# Patient Record
Sex: MALE | Race: WHITE
[De-identification: names, ages, dates, MRNs, and addresses within clinical notes are randomized per-mention and may not be internally consistent; named-entity substitution may affect disease eponyms.]

---

## 2018-03-27 ENCOUNTER — HOSPITAL ENCOUNTER (INPATIENT)
Dept: HOSPITAL 80 - FED | Age: 48
LOS: 4 days | Discharge: HOME | DRG: 515 | End: 2018-03-31
Attending: SURGERY | Admitting: SURGERY
Payer: MEDICAID

## 2018-03-27 DIAGNOSIS — S32.029A: ICD-10-CM

## 2018-03-27 DIAGNOSIS — S22.059A: ICD-10-CM

## 2018-03-27 DIAGNOSIS — S42.022B: Primary | ICD-10-CM

## 2018-03-27 DIAGNOSIS — S63.502A: ICD-10-CM
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
## 2018-03-27 DIAGNOSIS — S27.2XXA: ICD-10-CM

## 2018-03-27 DIAGNOSIS — S42.102A: ICD-10-CM

## 2018-03-27 DIAGNOSIS — S27.329A: ICD-10-CM

## 2018-03-27 DIAGNOSIS — S06.0X9A: ICD-10-CM
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
## 2018-03-27 DIAGNOSIS — R40.2412: ICD-10-CM

## 2018-03-27 DIAGNOSIS — Z87.820: ICD-10-CM

## 2018-03-27 DIAGNOSIS — S22.42XA: ICD-10-CM

## 2018-03-27 LAB
INR PPP: 1.04 (ref 0.83–1.16)
PLATELET # BLD: 215 10^3/UL (ref 150–400)
PROTHROMBIN TIME: 13.8 SEC (ref 12–15)

## 2018-03-27 PROCEDURE — G0480 DRUG TEST DEF 1-7 CLASSES: HCPCS

## 2018-03-27 PROCEDURE — 0PSB04Z REPOSITION LEFT CLAVICLE WITH INTERNAL FIXATION DEVICE, OPEN APPROACH: ICD-10-PCS | Performed by: ORTHOPAEDIC SURGERY

## 2018-03-27 PROCEDURE — C1713 ANCHOR/SCREW BN/BN,TIS/BN: HCPCS

## 2018-03-27 RX ADMIN — HYDROCODONE BITARTRATE AND ACETAMINOPHEN PRN TAB: 5; 325 TABLET ORAL at 20:15

## 2018-03-27 RX ADMIN — Medication SCH MLS: at 20:38

## 2018-03-27 RX ADMIN — ACETAMINOPHEN SCH: 500 TABLET ORAL at 19:48

## 2018-03-27 RX ADMIN — KETOROLAC TROMETHAMINE SCH MG: 30 INJECTION, SOLUTION INTRAMUSCULAR at 18:46

## 2018-03-27 RX ADMIN — Medication PRN MCG: at 19:00

## 2018-03-27 RX ADMIN — Medication PRN MCG: at 19:07

## 2018-03-27 RX ADMIN — ACETAMINOPHEN SCH MG: 500 TABLET ORAL at 22:29

## 2018-03-27 RX ADMIN — MENTHOL SCH: 1 SPRAY TOPICAL at 21:35
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
## 2018-03-27 NOTE — POSTOPPROG
Post Op Note


Date of Operation: 03/27/18


Surgeon: Mat uGtierres


Anesthesiologist: jessica


Anesthesia: GET(General Endotracheal)


Pre-op Diagnosis: open lt clavicle fx


Post-op Diagnosis: same


Procedure: ORIF lt clavicle, I&D


Inf/Abcess present in the surg proc area at time of surgery?: No


EBL: 


Complications: 





none
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
## 2018-03-28 LAB — PLATELET # BLD: 161 10^3/UL (ref 150–400)

## 2018-03-28 RX ADMIN — HYDROCODONE BITARTRATE AND ACETAMINOPHEN PRN TAB: 5; 325 TABLET ORAL at 10:23

## 2018-03-28 RX ADMIN — MULTIPLE VITAMINS W/ MINERALS TAB SCH EACH: TAB at 16:04

## 2018-03-28 RX ADMIN — CYCLOBENZAPRINE HYDROCHLORIDE PRN MG: 10 TABLET, FILM COATED ORAL at 10:23

## 2018-03-28 RX ADMIN — MENTHOL SCH PATCH: 1 SPRAY TOPICAL at 10:23

## 2018-03-28 RX ADMIN — OXYCODONE HYDROCHLORIDE AND ACETAMINOPHEN PRN TAB: 5; 325 TABLET ORAL at 00:43

## 2018-03-28 RX ADMIN — Medication SCH MLS: at 05:02

## 2018-03-28 RX ADMIN — ACETAMINOPHEN SCH MG: 500 TABLET ORAL at 23:53

## 2018-03-28 RX ADMIN — HYDROCODONE BITARTRATE AND ACETAMINOPHEN PRN TAB: 5; 325 TABLET ORAL at 16:03

## 2018-03-28 RX ADMIN — CYCLOBENZAPRINE HYDROCHLORIDE PRN MG: 10 TABLET, FILM COATED ORAL at 19:56

## 2018-03-28 RX ADMIN — KETOROLAC TROMETHAMINE SCH MG: 30 INJECTION, SOLUTION INTRAMUSCULAR at 00:21

## 2018-03-28 RX ADMIN — KETOROLAC TROMETHAMINE SCH MG: 30 INJECTION, SOLUTION INTRAMUSCULAR at 18:42

## 2018-03-28 RX ADMIN — ACETAMINOPHEN SCH MG: 500 TABLET ORAL at 16:03

## 2018-03-28 RX ADMIN — FLUTICASONE PROPIONATE SCH SPRAY: 50 SPRAY, METERED NASAL at 10:25

## 2018-03-28 RX ADMIN — KETOROLAC TROMETHAMINE SCH MG: 30 INJECTION, SOLUTION INTRAMUSCULAR at 23:49

## 2018-03-28 RX ADMIN — CYCLOBENZAPRINE HYDROCHLORIDE PRN MG: 10 TABLET, FILM COATED ORAL at 00:43

## 2018-03-28 RX ADMIN — HYDROCODONE BITARTRATE AND ACETAMINOPHEN PRN TAB: 5; 325 TABLET ORAL at 21:20

## 2018-03-28 RX ADMIN — KETOROLAC TROMETHAMINE SCH: 30 INJECTION, SOLUTION INTRAMUSCULAR at 14:26

## 2018-03-28 RX ADMIN — KETOROLAC TROMETHAMINE SCH MG: 30 INJECTION, SOLUTION INTRAMUSCULAR at 05:00

## 2018-03-28 RX ADMIN — CYCLOBENZAPRINE HYDROCHLORIDE PRN MG: 10 TABLET, FILM COATED ORAL at 16:03

## 2018-03-28 RX ADMIN — ACETAMINOPHEN SCH MG: 500 TABLET ORAL at 08:35

## 2018-03-28 NOTE — TRAUMAPNT
Trauma Tertiary Progress Note


New Findings: C/o left wrist and elbow discomfort. Left wrist evaluated by 

 and felt to not be fractured. Left elbow is also unremarkable to my 

exam.


Assessment/Plan: 


POD#1 PAD#1





3/28/2018





Assessment:





Pain control -   Patient consumed edible last PM. Those have been removed from 

the room and are stored by security. Pain control rated as good.


Rib fractures - Post Op CXR unremarkable. AM CXR pending. 


Scapular Fracture - sling helpful


T2 Facet Fracture - Stable per Dr. Martinez





Plan:


Mobilize today and consider possible discharge tomorrow


Subjective: 





C/o of left wrist and elbow ache


Objective: 





 Vital Signs











Temp Pulse Resp BP Pulse Ox


 


 37.1 C   93   15   116/77   92 


 


 03/28/18 08:00  03/28/18 08:00  03/28/18 08:00  03/28/18 08:00  03/28/18 08:00








 Laboratory Results





 03/28/18 04:50 





 03/28/18 04:50 





 











 03/27/18 03/28/18 03/29/18





 05:59 05:59 05:59


 


Intake Total  4000 


 


Output Total  2550 


 


Balance  1450 








 











PT  13.8 SEC (12.0-15.0)   03/27/18  13:10    


 


INR  1.04  (0.83-1.16)   03/27/18  13:10    














Physical Exam





- Physical Exam


General Appearance: WD/WN, alert, mild distress


EENT: PERRL/EOMI, normal ENT inspection


Neck: non-tender, full range of motion, supple, normal inspection


Respiratory: normal breath sounds, other (IS only to 1500)


Cardiac/Chest: regular rate, rhythm


Abdomen: normal bowel sounds, non-tender, soft


Male Genitalia: deferred


Rectal: deferred


Back: Normal inspection


Skin: normal color, warm/dry


Neuro/Psych: no motor/sensory deficits, alert, normal mood/affect, oriented x 3


Time Spent w/Patient (minutes): 25
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
## 2018-03-28 NOTE — SOAPPROG
SOAP Progress Note


Assessment/Plan: 


Assessment:


























Plan:





1. L wrist - possible sprain, no swelling, no evidence of fracture, will monitor


2. L clavicle - stay in sling, may remove for getting dressed, light motion OK, 

keep elbow below shoulder level


3. ortho will follow





03/28/18 11:10





Subjective: 





Patient doing well, pain controlled. Denies any neurologic sx. Some complaint 

of vague L wrist pain.


Objective: 





 Vital Signs











Temp Pulse Resp BP Pulse Ox


 


 37.1 C   93   15   116/77   92 


 


 03/28/18 08:00  03/28/18 08:00  03/28/18 08:00  03/28/18 08:00  03/28/18 08:00








 Laboratory Results





 03/28/18 04:50 





 03/28/18 04:50 





 











 03/27/18 03/28/18 03/29/18





 05:59 05:59 05:59


 


Intake Total  4000 


 


Output Total  2550 


 


Balance  1450 








 











PT  13.8 SEC (12.0-15.0)   03/27/18  13:10    


 


INR  1.04  (0.83-1.16)   03/27/18  13:10    








L clavicle - incision healing, no bleeding, NVI L arm, ROM deferred


L wrist - full AROM, minimal tender at distal ulna, no radius pain, no scaphoid 

pain, moving fingers well, NVI





- Time Spent With Patient


Time Spent With Patient: 





15





- Pending Discharge


Pending Discharge Within 24 Hours: No


Pending Discharge Within 48 Hours: No





ICD10 Worksheet


Patient Problems: 


 Problems











Problem Status Onset


 


Clavicle fracture, shaft Acute  


 


Fracture of lamina of thoracic vertebra Acute  


 


Ribs, multiple fractures Acute  


 


Scapular fracture Acute  


 


Traumatic hemothorax Acute

## 2018-03-29 RX ADMIN — KETOROLAC TROMETHAMINE SCH MG: 30 INJECTION, SOLUTION INTRAMUSCULAR at 23:51

## 2018-03-29 RX ADMIN — MENTHOL SCH PATCH: 1 SPRAY TOPICAL at 09:08

## 2018-03-29 RX ADMIN — DOCUSATE SODIUM AND SENNOSIDES SCH TAB: 50; 8.6 TABLET ORAL at 10:06

## 2018-03-29 RX ADMIN — ACETAMINOPHEN SCH: 500 TABLET ORAL at 09:31

## 2018-03-29 RX ADMIN — KETOROLAC TROMETHAMINE SCH MG: 30 INJECTION, SOLUTION INTRAMUSCULAR at 06:20

## 2018-03-29 RX ADMIN — ACETAMINOPHEN SCH: 500 TABLET ORAL at 16:13

## 2018-03-29 RX ADMIN — POLYETHYLENE GLYCOL 3350 PRN GM: 17 POWDER, FOR SOLUTION ORAL at 15:38

## 2018-03-29 RX ADMIN — DOCUSATE SODIUM AND SENNOSIDES SCH: 50; 8.6 TABLET ORAL at 19:38

## 2018-03-29 RX ADMIN — ACETAMINOPHEN SCH MG: 500 TABLET ORAL at 23:51

## 2018-03-29 RX ADMIN — KETOROLAC TROMETHAMINE SCH MG: 30 INJECTION, SOLUTION INTRAMUSCULAR at 12:18

## 2018-03-29 RX ADMIN — HYDROCODONE BITARTRATE AND ACETAMINOPHEN PRN TAB: 5; 325 TABLET ORAL at 14:51

## 2018-03-29 RX ADMIN — FLUTICASONE PROPIONATE SCH: 50 SPRAY, METERED NASAL at 10:19

## 2018-03-29 RX ADMIN — KETOROLAC TROMETHAMINE SCH MG: 30 INJECTION, SOLUTION INTRAMUSCULAR at 18:30

## 2018-03-29 RX ADMIN — MULTIPLE VITAMINS W/ MINERALS TAB SCH EACH: TAB at 09:07

## 2018-03-29 RX ADMIN — HYDROCODONE BITARTRATE AND ACETAMINOPHEN PRN TAB: 5; 325 TABLET ORAL at 09:07

## 2018-03-29 RX ADMIN — HYDROCODONE BITARTRATE AND ACETAMINOPHEN PRN TAB: 5; 325 TABLET ORAL at 21:35

## 2018-03-29 NOTE — TRAUMAPN
Trauma Progress Note


Assessment/Plan: 


2 days post injury/remains hemodynamically stable


s/p ORIF left clavicle fracture


T2 facet fx/T7 TP fx


multiple left rib fx.





Transfer to med surg/PT/OT


restart probiotics/bowel protocol


continue pulmonary exercises, increase activity


anticipate discharge 24-48 hours


Subjective: 





resting comfortably/reports obstipation


pain with coughing


Objective: 





 Vital Signs











Temp Pulse Resp BP Pulse Ox


 


 36.9 C   92   12   102/62   100 


 


 03/28/18 23:45  03/28/18 16:00  03/28/18 23:45  03/28/18 23:45  03/28/18 23:45








 Laboratory Results





 03/28/18 04:50 





 03/28/18 04:50 





 











 03/28/18 03/29/18 03/30/18





 05:59 05:59 05:59


 


Intake Total 4000 1700 


 


Output Total 2550  600


 


Balance 1450 1700 -600








 











PT  13.8 SEC (12.0-15.0)   03/27/18  13:10    


 


INR  1.04  (0.83-1.16)   03/27/18  13:10    














- C-Spine Clearance


Cervical Spine Cleared: Yes


Provider who Cleared Cervical Spine: Naveen





Physical Exam





- Physical Exam


General Appearance: alert, mild distress


Neck: non-tender


Respiratory: lungs clear, decreased breath sounds


Cardiac/Chest: regular rate, rhythm


Abdomen: normal bowel sounds, non-tender, soft, distended


Male Genitalia: deferred


Rectal: deferred


Skin: normal color, warm/dry


Extremities: other (Left arm in sling/distal NV intact)


Neuro/Psych: alert, normal mood/affect, oriented x 3


Time Spent w/Patient (minutes): 10

## 2018-03-29 NOTE — SOAPPROG
SOAP Progress Note


Assessment/Plan: 


Assessment:


























Plan:





1. L wrist - possible sprain, no swelling, no evidence of fracture, will monitor


2. L clavicle - stay in sling, may remove for getting dressed, light motion OK, 

keep elbow below shoulder level


3. ortho will follow





03/28/18 11:10





Subjective: 





Pain improved, doing well.


Objective: 





 Vital Signs











Temp Pulse Resp BP Pulse Ox


 


 36.9 C   88   16   106/67   88 L


 


 03/28/18 23:45  03/29/18 11:06  03/29/18 08:59  03/29/18 08:59  03/29/18 11:06








 Laboratory Results





 03/28/18 04:50 





 03/28/18 04:50 





 











 03/28/18 03/29/18 03/30/18





 05:59 05:59 05:59


 


Intake Total 4000 1700 


 


Output Total 2550  600


 


Balance 1450 1700 -600








 











PT  13.8 SEC (12.0-15.0)   03/27/18  13:10    


 


INR  1.04  (0.83-1.16)   03/27/18  13:10    








wound clean/dry, nvi





- Time Spent With Patient


Time Spent With Patient: 





5





- Pending Discharge


Pending Discharge Within 24 Hours: No


Pending Discharge Within 48 Hours: No





ICD10 Worksheet


Patient Problems: 


 Problems











Problem Status Onset


 


Clavicle fracture, shaft Acute  


 


Fracture of lamina of thoracic vertebra Acute  


 


Ribs, multiple fractures Acute  


 


Scapular fracture Acute  


 


Traumatic hemothorax Acute

## 2018-03-30 LAB — PLATELET # BLD: 151 10^3/UL (ref 150–400)

## 2018-03-30 RX ADMIN — HYDROCODONE BITARTRATE AND ACETAMINOPHEN PRN TAB: 5; 325 TABLET ORAL at 21:39

## 2018-03-30 RX ADMIN — KETOROLAC TROMETHAMINE SCH MG: 30 INJECTION, SOLUTION INTRAMUSCULAR at 08:25

## 2018-03-30 RX ADMIN — ACETAMINOPHEN SCH: 500 TABLET ORAL at 16:02

## 2018-03-30 RX ADMIN — DOCUSATE SODIUM AND SENNOSIDES SCH TAB: 50; 8.6 TABLET ORAL at 08:26

## 2018-03-30 RX ADMIN — POLYETHYLENE GLYCOL 3350 PRN GM: 17 POWDER, FOR SOLUTION ORAL at 19:53

## 2018-03-30 RX ADMIN — FLUTICASONE PROPIONATE SCH: 50 SPRAY, METERED NASAL at 08:26

## 2018-03-30 RX ADMIN — DOCUSATE SODIUM AND SENNOSIDES SCH: 50; 8.6 TABLET ORAL at 19:46

## 2018-03-30 RX ADMIN — HYDROCODONE BITARTRATE AND ACETAMINOPHEN PRN TAB: 5; 325 TABLET ORAL at 08:26

## 2018-03-30 RX ADMIN — KETOROLAC TROMETHAMINE SCH MG: 30 INJECTION, SOLUTION INTRAMUSCULAR at 15:43

## 2018-03-30 RX ADMIN — ACETAMINOPHEN SCH MG: 500 TABLET ORAL at 08:24

## 2018-03-30 RX ADMIN — ACETAMINOPHEN SCH MG: 500 TABLET ORAL at 15:42

## 2018-03-30 RX ADMIN — MULTIPLE VITAMINS W/ MINERALS TAB SCH EACH: TAB at 08:26

## 2018-03-30 RX ADMIN — HYDROCODONE BITARTRATE AND ACETAMINOPHEN PRN TAB: 5; 325 TABLET ORAL at 15:54

## 2018-03-30 RX ADMIN — KETOROLAC TROMETHAMINE SCH MG: 30 INJECTION, SOLUTION INTRAMUSCULAR at 17:53

## 2018-03-30 RX ADMIN — MENTHOL SCH PATCH: 1 SPRAY TOPICAL at 08:22

## 2018-03-30 NOTE — TRAUMAPN
Trauma Progress Note


Assessment/Plan: 


POD#1 PAD#1





3/28/2018





Assessment:





Pain control -   Patient consumed edible last PM. Those have been removed from 

the room and are stored by security. Pain control rated as good.


Rib fractures - Post Op CXR unremarkable. AM CXR pending. 


Scapular Fracture - sling helpful


T2 Facet Fracture - Stable per Dr. Martinez





Plan:


Mobilize today and consider possible discharge tomorrow








POD#3 PAD#3





3/30/2018





Pain control - good


Respiratory - SATs good, Lungs clear, still has E to A changes (less) zahraa left 

lower lung fields, IS to 2000


Scapular fracture - more pain  which he feels is do to more use.


HCT dropped as expected





Plan:


Continue to mobilize


F/u HCT


OT to evaluate today


Will get F/U CXR


Subjective: 





No stool or flatus


Objective: 





 Vital Signs











Temp Pulse Resp BP Pulse Ox


 


 37.0 C   86   12   90/57 L  90 L


 


 03/29/18 23:52  03/30/18 04:13  03/30/18 04:13  03/30/18 04:13  03/30/18 04:13








 Laboratory Results





 03/28/18 04:50 





 03/28/18 04:50 





 











 03/29/18 03/30/18 03/31/18





 05:59 05:59 05:59


 


Intake Total 1700 2500 


 


Output Total  1300 


 


Balance 1700 1200 








 











PT  13.8 SEC (12.0-15.0)   03/27/18  13:10    


 


INR  1.04  (0.83-1.16)   03/27/18  13:10    














- C-Spine Clearance


Cervical Spine Cleared: Yes


Provider who Cleared Cervical Spine: Naveen





Physical Exam





- Physical Exam


General Appearance: WD/WN, alert, mild distress


Neck: non-tender, full range of motion, supple


Respiratory: chest non-tender, lungs clear, normal breath sounds, other (E to A 

changes persist in left base but have diminished)


Cardiac/Chest: regular rate, rhythm


Abdomen: normal bowel sounds, non-tender, soft


Rectal: deferred


Back: Normal inspection


Skin: normal color, warm/dry


Extremities: normal range of motion, other (left arm in sling for comfort)


Neuro/Psych: no motor/sensory deficits, alert, normal mood/affect, oriented x 3


Time Spent w/Patient (minutes): 25

## 2018-03-30 NOTE — ASMTCMCOM
CM Note

 

CM Note                       

Notes:

OT states no OT follow up needs and PT states patient can do any rehab on an outpatient 

basis. Patient will most likely D/C independent. CM available if D/C needs arise.

 

Date Signed:  03/30/2018 01:02 PM

Electronically Signed By:Nishi Esquivel LCSW

## 2018-03-31 VITALS — SYSTOLIC BLOOD PRESSURE: 105 MMHG | DIASTOLIC BLOOD PRESSURE: 61 MMHG

## 2018-03-31 RX ADMIN — ACETAMINOPHEN SCH: 500 TABLET ORAL at 00:39

## 2018-03-31 RX ADMIN — OXYCODONE HYDROCHLORIDE AND ACETAMINOPHEN PRN TAB: 5; 325 TABLET ORAL at 03:07

## 2018-03-31 RX ADMIN — OXYCODONE HYDROCHLORIDE AND ACETAMINOPHEN PRN TAB: 5; 325 TABLET ORAL at 13:40

## 2018-03-31 RX ADMIN — MULTIPLE VITAMINS W/ MINERALS TAB SCH EACH: TAB at 09:04

## 2018-03-31 RX ADMIN — ACETAMINOPHEN SCH: 500 TABLET ORAL at 07:40

## 2018-03-31 RX ADMIN — FLUTICASONE PROPIONATE SCH SPRAY: 50 SPRAY, METERED NASAL at 09:04

## 2018-03-31 RX ADMIN — OXYCODONE HYDROCHLORIDE AND ACETAMINOPHEN PRN TAB: 5; 325 TABLET ORAL at 16:43

## 2018-03-31 RX ADMIN — KETOROLAC TROMETHAMINE SCH MG: 30 INJECTION, SOLUTION INTRAMUSCULAR at 00:29

## 2018-03-31 RX ADMIN — KETOROLAC TROMETHAMINE SCH MG: 30 INJECTION, SOLUTION INTRAMUSCULAR at 12:04

## 2018-03-31 RX ADMIN — MENTHOL SCH PATCH: 1 SPRAY TOPICAL at 09:06

## 2018-03-31 RX ADMIN — KETOROLAC TROMETHAMINE SCH MG: 30 INJECTION, SOLUTION INTRAMUSCULAR at 05:38

## 2018-03-31 RX ADMIN — HYDROCODONE BITARTRATE AND ACETAMINOPHEN PRN TAB: 5; 325 TABLET ORAL at 00:29

## 2018-03-31 RX ADMIN — OXYCODONE HYDROCHLORIDE AND ACETAMINOPHEN PRN TAB: 5; 325 TABLET ORAL at 07:39

## 2018-03-31 RX ADMIN — DOCUSATE SODIUM AND SENNOSIDES SCH TAB: 50; 8.6 TABLET ORAL at 09:04

## 2018-03-31 NOTE — GDS
[f 
rep st]



                                                             DISCHARGE SUMMARY





DISCHARGE DIAGNOSES:  

1.  Snowboard crash into a tree.

2.  Left midshaft clavicular fracture.

3.  Fractures of left ribs 1 through 8 with two them in 2 places, left 
pulmonary contusion, small left hemo/hydrothorax, small pneumothorax (resolved)
, left scapular fracture, fracture transverse process of T5, fracture posterior 
element of T2 with 1 mm fragment in the canal. 



Condition is dramatically improved.  Surgery performed ORIF left clavicle by 
Dr. Mat Gutierres.  Consultations provided by Dr. Nasim Martinez, neurosurgery.



DIET:  There are no diet restrictions at discharge, just the suggestion that he 
avoid constipating foods, such as bananas, rice, applesauce, and cheese.  
Texture is unremarkable.  For pain control upon discharge, he will be taking 
Percocet 5/325, Toradol 10 mg p.o. q.6h (#12).  He will take Flexeril 10 mg 
every 8 hours as needed for spasm.  He will use a lidocaine patch on his left 
chest.  Continue his multivitamins.  He will continue Flonase nasal spray 1 
spray daily.  He is not to take his oral supplements, aspirin or routine 
Tylenol because of the Tylenol already present in the Percocet.



ACTIVITY:  He has no restrictions of movement of his cervical, thoracic, or 
lumbar spine.  If he notes any difficulties or deficits, he is to report that 
to neurosurgery.  He is to wear a sling for his left scapular fracture.  He is 
to follow up with Dr. Nasim Martinez and staff and neurosurgical staff in 2 
weeks.  He is to follow up with Dr. Mat Gutierres in 2 weeks, and he will 
follow up with Ambrocio Campos/Salma Geller/Corey Anthony in approximately 
2 weeks from a chest trauma standpoint.



HOSPITAL COURSE:  The patient was admitted and taken to the operating room 
where there was an ORIF of his compound left clavicular fracture. 



Dr. Nasim Martinez was consulted because of the small posterior element chip 
fracture in the canal.  He feels this is structurally stable and represents a 
noncritical finding. 



His scapular fracture, left chest fracture, and transverse process fractures 
were all observed.  He did not need a chest tube as his tiny pneumothorax 
resolved.  The hemo hydrothorax is stabilized and resolving.  His hematocrit 
dropped to 34 and that is stable.  His pain is well controlled at this point.  
He has been moving his bowels and eating well.  I feel he is set for discharge 
at this time.  Note is made his incision is clean and dry.





Job #:  [id number]

MTDD

## 2018-03-31 NOTE — TRAUMAPNT
Trauma Tertiary Progress Note


Assessment/Plan: 


POD#1 PAD#1





3/28/2018





Assessment:





Pain control -   Patient consumed edible last PM. Those have been removed from 

the room and are stored by security. Pain control rated as good.


Rib fractures - Post Op CXR unremarkable. AM CXR pending. 


Scapular Fracture - sling helpful


T2 Facet Fracture - Stable per Dr. Martinez





Plan:


Mobilize today and consider possible discharge tomorrow








POD#3 PAD#3





3/30/2018





Pain control - good


Respiratory - SATs good, Lungs clear, still has E to A changes (less) zahraa left 

lower lung fields, IS to 2000


Scapular fracture - more pain  which he feels is do to more use.


HCT dropped as expected





Plan:


Continue to mobilize


F/u HCT


OT to evaluate today


Will get F/U CXR














3/31/2018





POD#4 & PAD#4





Assessment:





Pain control good -


Respiratory - SATs good, Lungs clear, E to A changes have resolved, CXR 

yesterday shows left pleural fluid collection


HCT stable





Plan:


F/u CXR with decub to assess the need for thoracentesis


Subjective: 





I'm feeling better


Objective: 





 Vital Signs











Temp Pulse Resp BP Pulse Ox


 


 36.8 C   78   20   106/65   92 


 


 03/31/18 07:43  03/31/18 07:43  03/31/18 07:43  03/31/18 07:43  03/31/18 07:43








 Laboratory Results





 03/30/18 11:30 





 03/28/18 04:50 





 











 03/30/18 03/31/18 04/01/18





 05:59 05:59 05:59


 


Intake Total 2500 1000 950


 


Output Total 1300 475 400


 


Balance 1200 525 550








 











PT  13.8 SEC (12.0-15.0)   03/27/18  13:10    


 


INR  1.04  (0.83-1.16)   03/27/18  13:10    














- C-Spine Clearance


Cervical Spine Cleared: Yes


Provider who Cleared Cervical Spine: Naveen





Physical Exam





- Physical Exam


General Appearance: WD/WN, alert, mild distress


Neck: full range of motion, supple


Respiratory: lungs clear, normal breath sounds, other (No E to A changes today)


Cardiac/Chest: regular rate, rhythm


Abdomen: normal bowel sounds, non-tender, soft, other (Had a bowel movement)


Male Genitalia: deferred


Rectal: deferred


Back: Normal inspection


Skin: normal color, warm/dry


Neuro/Psych: no motor/sensory deficits, alert, normal mood/affect, oriented x 3


Time Spent w/Patient (minutes): 35

## 2018-05-22 ENCOUNTER — HOSPITAL ENCOUNTER (OUTPATIENT)
Dept: HOSPITAL 80 - FIMAGING | Age: 48
End: 2018-05-22
Attending: ORTHOPAEDIC SURGERY
Payer: MEDICAID

## 2018-05-22 DIAGNOSIS — S42.002D: Primary | ICD-10-CM
